# Patient Record
Sex: FEMALE | Race: WHITE | NOT HISPANIC OR LATINO | URBAN - METROPOLITAN AREA
[De-identification: names, ages, dates, MRNs, and addresses within clinical notes are randomized per-mention and may not be internally consistent; named-entity substitution may affect disease eponyms.]

---

## 2021-12-01 ENCOUNTER — TELEPHONE ENCOUNTER (OUTPATIENT)
Dept: URBAN - METROPOLITAN AREA CLINIC 113 | Facility: CLINIC | Age: 46
End: 2021-12-01

## 2021-12-01 VITALS — BODY MASS INDEX: 22.35 KG/M2 | WEIGHT: 165 LBS | HEIGHT: 72 IN

## 2021-12-01 RX ORDER — CITALOPRAM HYDROBROMIDE 40 MG/1
0.5 TABLET TABLET, FILM COATED ORAL ONCE A DAY
Status: ACTIVE | COMMUNITY

## 2021-12-01 RX ORDER — POLYETHYLENE GLYCOL 3350, SODIUM SULFATE, SODIUM CHLORIDE, POTASSIUM CHLORIDE, ASCORBIC ACID, SODIUM ASCORBATE 140-9-5.2G
AS DIRECTED KIT ORAL ONCE
Qty: 1 LITER | Refills: 0 | OUTPATIENT

## 2021-12-07 ENCOUNTER — TELEPHONE ENCOUNTER (OUTPATIENT)
Dept: URBAN - METROPOLITAN AREA CLINIC 113 | Facility: CLINIC | Age: 46
End: 2021-12-07

## 2021-12-08 ENCOUNTER — LAB OUTSIDE AN ENCOUNTER (OUTPATIENT)
Dept: URBAN - METROPOLITAN AREA CLINIC 113 | Facility: CLINIC | Age: 46
End: 2021-12-08

## 2022-01-10 ENCOUNTER — OFFICE VISIT (OUTPATIENT)
Dept: URBAN - METROPOLITAN AREA SURGERY CENTER 25 | Facility: SURGERY CENTER | Age: 47
End: 2022-01-10

## 2022-01-25 ENCOUNTER — OFFICE VISIT (OUTPATIENT)
Dept: URBAN - METROPOLITAN AREA CLINIC 113 | Facility: CLINIC | Age: 47
End: 2022-01-25

## 2022-03-07 ENCOUNTER — WEB ENCOUNTER (OUTPATIENT)
Dept: URBAN - METROPOLITAN AREA SURGERY CENTER 25 | Facility: SURGERY CENTER | Age: 47
End: 2022-03-07

## 2022-03-07 ENCOUNTER — OFFICE VISIT (OUTPATIENT)
Dept: URBAN - METROPOLITAN AREA SURGERY CENTER 25 | Facility: SURGERY CENTER | Age: 47
End: 2022-03-07
Payer: COMMERCIAL

## 2022-03-07 DIAGNOSIS — R19.7 DIARRHEA: ICD-10-CM

## 2022-03-07 PROCEDURE — G8907 PT DOC NO EVENTS ON DISCHARG: HCPCS | Performed by: INTERNAL MEDICINE

## 2022-03-07 PROCEDURE — 45380 COLONOSCOPY AND BIOPSY: CPT | Performed by: INTERNAL MEDICINE

## 2022-03-07 RX ORDER — POLYETHYLENE GLYCOL 3350, SODIUM SULFATE, SODIUM CHLORIDE, POTASSIUM CHLORIDE, ASCORBIC ACID, SODIUM ASCORBATE 140-9-5.2G
AS DIRECTED KIT ORAL ONCE
Qty: 1 LITER | Refills: 0 | Status: ACTIVE | COMMUNITY

## 2022-03-07 RX ORDER — CITALOPRAM HYDROBROMIDE 40 MG/1
0.5 TABLET TABLET, FILM COATED ORAL ONCE A DAY
Status: ACTIVE | COMMUNITY

## 2022-03-29 ENCOUNTER — OFFICE VISIT (OUTPATIENT)
Dept: URBAN - METROPOLITAN AREA CLINIC 113 | Facility: CLINIC | Age: 47
End: 2022-03-29
Payer: COMMERCIAL

## 2022-03-29 VITALS
DIASTOLIC BLOOD PRESSURE: 70 MMHG | HEIGHT: 72 IN | HEART RATE: 72 BPM | TEMPERATURE: 97.8 F | SYSTOLIC BLOOD PRESSURE: 108 MMHG | BODY MASS INDEX: 22.48 KG/M2 | WEIGHT: 166 LBS

## 2022-03-29 DIAGNOSIS — Z12.11 COLON CANCER SCREENING: ICD-10-CM

## 2022-03-29 DIAGNOSIS — K59.1 FUNCTIONAL DIARRHEA: ICD-10-CM

## 2022-03-29 PROCEDURE — 99213 OFFICE O/P EST LOW 20 MIN: CPT | Performed by: NURSE PRACTITIONER

## 2022-03-29 RX ORDER — CITALOPRAM HYDROBROMIDE 40 MG/1
0.5 TABLET TABLET, FILM COATED ORAL ONCE A DAY
Status: ACTIVE | COMMUNITY

## 2022-03-29 RX ORDER — POLYETHYLENE GLYCOL 3350, SODIUM SULFATE, SODIUM CHLORIDE, POTASSIUM CHLORIDE, ASCORBIC ACID, SODIUM ASCORBATE 140-9-5.2G
AS DIRECTED KIT ORAL ONCE
Qty: 1 LITER | Refills: 0 | Status: ON HOLD | COMMUNITY

## 2022-03-29 NOTE — HPI-TODAY'S VISIT:
46 yof presenting for follow up after a screening colonoscopy.  A colonoscopy was performed 3/7/22. Normal ileum. Normal rectum. Normal mucosa in the entire examined colon s/p biopsies. Random colon biopsies showed no significant histopathologic pathology.  She admits that she is lactose intolerance, and tries to avoid dairy as much as possible. She also admits a high stress job, as she is a teacher. She can have loose stools following dairy, fried foods, etc. No abdominal pain. No nausea or vomiting. She does admit some increased gas. Her weight is stable. No heartburn or dysphagia.

## 2022-06-03 ENCOUNTER — OFFICE VISIT (OUTPATIENT)
Dept: URBAN - METROPOLITAN AREA CLINIC 113 | Facility: CLINIC | Age: 47
End: 2022-06-03

## 2022-12-15 ENCOUNTER — WEB ENCOUNTER (OUTPATIENT)
Dept: URBAN - METROPOLITAN AREA CLINIC 113 | Facility: CLINIC | Age: 47
End: 2022-12-15

## 2022-12-15 ENCOUNTER — OFFICE VISIT (OUTPATIENT)
Dept: URBAN - METROPOLITAN AREA CLINIC 113 | Facility: CLINIC | Age: 47
End: 2022-12-15
Payer: COMMERCIAL

## 2022-12-15 VITALS
RESPIRATION RATE: 16 BRPM | WEIGHT: 161 LBS | SYSTOLIC BLOOD PRESSURE: 90 MMHG | TEMPERATURE: 98 F | HEART RATE: 94 BPM | BODY MASS INDEX: 21.81 KG/M2 | DIASTOLIC BLOOD PRESSURE: 63 MMHG | HEIGHT: 72 IN

## 2022-12-15 DIAGNOSIS — R10.13 EPIGASTRIC PAIN: ICD-10-CM

## 2022-12-15 DIAGNOSIS — K59.1 FUNCTIONAL DIARRHEA: ICD-10-CM

## 2022-12-15 PROBLEM — 79922009: Status: ACTIVE | Noted: 2022-12-15

## 2022-12-15 PROBLEM — 47812002: Status: ACTIVE | Noted: 2022-12-15

## 2022-12-15 PROCEDURE — 99213 OFFICE O/P EST LOW 20 MIN: CPT | Performed by: INTERNAL MEDICINE

## 2022-12-15 RX ORDER — OMEPRAZOLE 20 MG/1
1 CAPSULE 30 MINUTES BEFORE MORNING MEAL CAPSULE, DELAYED RELEASE ORAL ONCE A DAY
Status: ACTIVE | COMMUNITY

## 2022-12-15 RX ORDER — POLYETHYLENE GLYCOL 3350, SODIUM SULFATE, SODIUM CHLORIDE, POTASSIUM CHLORIDE, ASCORBIC ACID, SODIUM ASCORBATE 140-9-5.2G
AS DIRECTED KIT ORAL ONCE
Qty: 1 LITER | Refills: 0 | Status: ON HOLD | COMMUNITY

## 2022-12-15 RX ORDER — CITALOPRAM HYDROBROMIDE 40 MG/1
0.5 TABLET TABLET, FILM COATED ORAL ONCE A DAY
Status: ACTIVE | COMMUNITY

## 2022-12-15 RX ORDER — PANTOPRAZOLE SODIUM 40 MG/1
1 TABLET TABLET, DELAYED RELEASE ORAL ONCE A DAY
Qty: 30 | Refills: 2 | OUTPATIENT
Start: 2022-12-15

## 2022-12-15 NOTE — HPI-OTHER HISTORIES
Colonoscopy on 3/7/2022 revealed a normal terminal ileum, normal colonic mucosa biopsies were obtained.  There were no colon polyps.  Pathology revealed colonic mucosa with no significant histopathologic changes.  Negative for microscopic colitis.

## 2022-12-15 NOTE — PHYSICAL EXAM GASTROINTESTINAL
Abdomen , soft, Mildly tender in epigastric and right upper quadrant to deep palpation, nondistended , no guarding or rigidity , no masses palpable , normal bowel sounds , Liver and Spleen,  no hepatosplenomegaly , liver nontender

## 2022-12-15 NOTE — HPI-TODAY'S VISIT:
47 yof presenting for complaints of heartburn.  She admits that she is lactose intolerance, and tries to avoid dairy as much as possible. She also admits a high stress job, as she is a teacher.  On Saturday she went tailgatting and ate food associated with that and drinks some wine.  On Sunday she began having epigastric pain that was nonradiating.  It was fairly steady and she would use Tums and famotidine but it persisted and she went to the urgent care and started on omeprazole which she took every day.  She had some heartburn symptoms but mostly his epigastric pain.  Sometimes eating made it better.  Finally on Friday of last week the pain has gone away.  She is continuing to take omeprazole.  During this period of time her bowels were about the same.  She had a little diarrhea midweek that was yellow in color.  There is no bright red blood per rectum or any melena.  She had no nausea or vomiting, fevers or chills.  She denies any heartburn currently or any dysphagia.  She does take ibuprofen about once a week.

## 2022-12-16 LAB
A/G RATIO: 1.7
ABSOLUTE BASOPHILS: 41
ABSOLUTE EOSINOPHILS: 198
ABSOLUTE LYMPHOCYTES: 957
ABSOLUTE MONOCYTES: 409
ABSOLUTE NEUTROPHILS: 2995
ALBUMIN: 4.4
ALKALINE PHOSPHATASE: 37
ALT (SGPT): 10
AST (SGOT): 15
BASOPHILS: 0.9
BILIRUBIN, TOTAL: 0.3
BUN/CREATININE RATIO: (no result)
BUN: 16
CALCIUM: 9.7
CARBON DIOXIDE, TOTAL: 29
CHLORIDE: 104
CREATININE: 0.91
EGFR: 79
EOSINOPHILS: 4.3
GLOBULIN, TOTAL: 2.6
GLUCOSE: 74
HEMATOCRIT: 36.5
HEMOGLOBIN: 11.7
LIPASE: 34
LYMPHOCYTES: 20.8
MCH: 25.7
MCHC: 32.1
MCV: 80
MONOCYTES: 8.9
MPV: 11
NEUTROPHILS: 65.1
PLATELET COUNT: 297
POTASSIUM: 4.2
PROTEIN, TOTAL: 7
RDW: 13.2
RED BLOOD CELL COUNT: 4.56
SODIUM: 140
WHITE BLOOD CELL COUNT: 4.6

## 2022-12-19 ENCOUNTER — TELEPHONE ENCOUNTER (OUTPATIENT)
Dept: URBAN - METROPOLITAN AREA CLINIC 113 | Facility: CLINIC | Age: 47
End: 2022-12-19

## 2023-03-24 ENCOUNTER — DASHBOARD ENCOUNTERS (OUTPATIENT)
Age: 48
End: 2023-03-24

## 2023-03-24 ENCOUNTER — OFFICE VISIT (OUTPATIENT)
Dept: URBAN - METROPOLITAN AREA CLINIC 113 | Facility: CLINIC | Age: 48
End: 2023-03-24
Payer: COMMERCIAL

## 2023-03-24 VITALS
HEIGHT: 72 IN | TEMPERATURE: 97.8 F | BODY MASS INDEX: 22.19 KG/M2 | SYSTOLIC BLOOD PRESSURE: 114 MMHG | RESPIRATION RATE: 16 BRPM | HEART RATE: 68 BPM | WEIGHT: 163.8 LBS | DIASTOLIC BLOOD PRESSURE: 56 MMHG

## 2023-03-24 DIAGNOSIS — R14.3 EXCESSIVE GAS: ICD-10-CM

## 2023-03-24 DIAGNOSIS — R10.13 EPIGASTRIC PAIN: ICD-10-CM

## 2023-03-24 PROBLEM — 80301007: Status: ACTIVE | Noted: 2023-03-24

## 2023-03-24 PROCEDURE — 99212 OFFICE O/P EST SF 10 MIN: CPT | Performed by: INTERNAL MEDICINE

## 2023-03-24 RX ORDER — POLYETHYLENE GLYCOL 3350, SODIUM SULFATE, SODIUM CHLORIDE, POTASSIUM CHLORIDE, ASCORBIC ACID, SODIUM ASCORBATE 140-9-5.2G
AS DIRECTED KIT ORAL ONCE
Qty: 1 LITER | Refills: 0 | Status: ON HOLD | COMMUNITY

## 2023-03-24 RX ORDER — OMEPRAZOLE 20 MG/1
1 CAPSULE 30 MINUTES BEFORE MORNING MEAL CAPSULE, DELAYED RELEASE ORAL ONCE A DAY
Status: ON HOLD | COMMUNITY

## 2023-03-24 RX ORDER — CITALOPRAM HYDROBROMIDE 40 MG/1
0.5 TABLET TABLET, FILM COATED ORAL ONCE A DAY
Status: ACTIVE | COMMUNITY

## 2023-03-24 RX ORDER — PANTOPRAZOLE SODIUM 40 MG/1
1 TABLET TABLET, DELAYED RELEASE ORAL ONCE A DAY
Qty: 30 | Refills: 2 | Status: ACTIVE | COMMUNITY
Start: 2022-12-15

## 2023-03-24 NOTE — HPI-OTHER HISTORIES
Abdominal ultra sound on 12/21/2022 revealed a normal liver and gallbladder.  Colonoscopy on 3/7/2022 revealed a normal terminal ileum, normal colonic mucosa biopsies were obtained.  There were no colon polyps.  Pathology revealed colonic mucosa with no significant histopathologic changes.  Negative for microscopic colitis.

## 2023-03-24 NOTE — HPI-TODAY'S VISIT:
47 yof presenting for complaints of heartburn.  She admits that she is lactose intolerance, and tries to avoid dairy as much as possible. She also admits a high stress job, as she is a teacher.  On Saturday she went tailgatting and ate food associated with that and drinks some wine.  On Sunday she began having epigastric pain that was nonradiating.  It was fairly steady and she would use Tums and famotidine but it persisted and she went to the urgent care and started on omeprazole which she took every day.  She had some heartburn symptoms but mostly his epigastric pain.  Sometimes eating made it better.  Finally on Friday of last week the pain has gone away.  She is continuing to take omeprazole.    She is on a proton pump inhibitor and has no issues currently.  She denies any abdominal pain or heartburn.  She is lactose intolerant and avoids dairy products.  She does still have some flatus and gaseousness.  She has not tried a probiotic.  There is no nausea or vomiting, fevers or chills.  She does have a bowel movement every day has been no blood or melena.  There is no diarrhea. Blood work on 1/27/2023 revealed hemoglobin 11.5, WBC of 5.2 and platelet count 206,000.  Sodium 139 potassium 4 BUN 15 creatinine 0.82.  AST 27, ALT 15, alkaline phosphatase 29, total bili 0.4.